# Patient Record
Sex: FEMALE | Race: WHITE
[De-identification: names, ages, dates, MRNs, and addresses within clinical notes are randomized per-mention and may not be internally consistent; named-entity substitution may affect disease eponyms.]

---

## 2018-11-13 ENCOUNTER — HOSPITAL ENCOUNTER (EMERGENCY)
Dept: HOSPITAL 80 - FED | Age: 63
Discharge: HOME | End: 2018-11-13
Payer: OTHER GOVERNMENT

## 2018-11-13 VITALS — SYSTOLIC BLOOD PRESSURE: 150 MMHG | DIASTOLIC BLOOD PRESSURE: 96 MMHG

## 2018-11-13 DIAGNOSIS — I10: ICD-10-CM
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
## 2018-11-13 DIAGNOSIS — S73.005A: Primary | ICD-10-CM

## 2018-11-13 PROCEDURE — 0SSBXZZ REPOSITION LEFT HIP JOINT, EXTERNAL APPROACH: ICD-10-PCS

## 2018-11-13 NOTE — EDPHY
H & P


Stated Complaint: Possible hip dislocation


Time Seen by Provider: 11/13/18 14:01


HPI/ROS: 





CHIEF COMPLAINT:  Left hip pain





HISTORY OF PRESENT ILLNESS:  63-year-old female status post left total hip 

replacement presents with left hip pain.  She was doing exercises on the floor 

under the guidance of her physical therapist when she felt a pop in her left hip

, followed by immediate onset of severe left hip pain.  Unable to bear weight.  

Similar to prior left hip dislocations.  Last oral intake was 6 hr ago.  No 

numbness or weakness.  No other injuries.





REVIEW OF SYSTEMS:  complete 10 point ROS reviewed and is negative except for 

the noted elements in the HPI








- Personal History


Current Tetanus/Diphtheria Vaccine: Unsure


Current Tetanus Diphtheria and Acellular Pertussis (TDAP): Unsure





- Medical/Surgical History


Hx Asthma: No


Hx Chronic Respiratory Disease: No


Hx Diabetes: No


Hx Cardiac Disease: No


Hx Renal Disease: No


Hx Cirrhosis: No


Hx Alcoholism: No


Hx HIV/AIDS: No


Hx Splenectomy or Spleen Trauma: No


Other PMH: HTN, hyponatremia





- Social History


Smoking Status: Never smoked


Alcohol Use: Sober


Additional Social History: 














- Physical Exam


Exam: 





General Appearance:  Alert, pleasant


Eyes:  Pupils equal and round, no conjunctival pallor


ENT, Mouth:  Mucous membranes moist


Neck:  Normal inspection


Respiratory:  Lungs are clear to auscultation


Cardiovascular:  Regular rate and rhythm


Gastrointestinal:  Abdomen is soft and nontender


Neurological:  A&O, nonfocal exam


Skin:  Warm and dry, no rash


Extremities:  Left hip tenderness, held in flexion, pain with any movement


Psychiatric:  Mood and affect normal





Constitutional: 


 Initial Vital Signs











Temperature (C)  36.7 C   11/13/18 13:50


 


Heart Rate  90   11/13/18 13:50


 


Respiratory Rate  16   11/13/18 13:50


 


Blood Pressure  184/114 H  11/13/18 13:50


 


O2 Sat (%)  100   11/13/18 13:50








 











O2 Delivery Mode               Room Air


 


O2 (L/minute)                  15














Allergies/Adverse Reactions: 


 





No Known Allergies Allergy (Verified 02/10/13 18:43)


 








Home Medications: 














 Medication  Instructions  Recorded


 


Amlodipine Besylate  11/13/18


 


Fosamax 5mg  11/13/18


 


Lisinopril  11/13/18














Medical Decision Making





- Diagnostics


Imaging Results: 


 Imaging Impressions





Hip X-Ray  11/13/18 14:02


Impression:  Lateral dislocation of the left total hip arthroplasty without 

acute fracture.








Hip X-Ray  11/13/18 15:15


Impression:


1. No residual dislocation of the left total hip arthroplasty.


2. No acute fracture.


3. Please see above findings.


 











Imaging: I viewed and interpreted images myself


Procedures: 





Procedure:  Dislocation reduction.


Indication:  Dislocation of the left hip joint.


Risks, benefits, alternatives discussed with the patient and consent obtained.  

Propofol 80 mg IV given.  The hip was reduced in the usual fashion without 

complications.  Post reduction the patient's neurovascular exam is normal.


Post reduction x-ray demonstrates reduction of the joint to the anatomic 

position.


The procedure was performed by myself.





ED Course/Re-evaluation: 





This patient presents with a left hip dislocation.  Propofol 80 mg IV given.  

The hip was reduced by me using flexion and mild traction.  Repeat x-ray 

reveals a left hip in good positioning.  The patient tolerated propofol well 

and recovered quickly.  She was discharged home with her .  Will follow 

up with Ortho in the office.


Differential Diagnosis: 





Differential diagnosis includes though it is not limited to fracture, tendon 

disruption, neurovascular compromise.





- Data Points


Medications Given: 


 








Discontinued Medications





Propofol (Diprivan)  80 mg IVP EDNOW ONE


   Stop: 11/13/18 15:03


   Last Admin: 11/13/18 15:03 Dose:  80 mg








Departure





- Departure


Disposition: Home, Routine, Self-Care


Clinical Impression: 


Dislocation of left hip


Qualifiers:


 Encounter type: initial encounter Qualified Code(s): S73.005A - Unspecified 

dislocation of left hip, initial encounter





Condition: Good


Instructions:  Hip Dislocation (ED), Hip Abduction Pillow (DC)


Additional Instructions: 


Ibuprofen 600 mg 3 times daily while the pain persists.


Call your orthopedic surgeon at [facility] to make an appointment.





Referrals: 


Cali Ayoub MD [Medical Doctor] - As per Instructions